# Patient Record
Sex: FEMALE
[De-identification: names, ages, dates, MRNs, and addresses within clinical notes are randomized per-mention and may not be internally consistent; named-entity substitution may affect disease eponyms.]

---

## 2019-10-23 ENCOUNTER — APPOINTMENT (OUTPATIENT)
Dept: OTOLARYNGOLOGY | Facility: CLINIC | Age: 44
End: 2019-10-23

## 2019-11-18 ENCOUNTER — APPOINTMENT (OUTPATIENT)
Dept: CT IMAGING | Facility: IMAGING CENTER | Age: 44
End: 2019-11-18
Payer: COMMERCIAL

## 2019-11-18 ENCOUNTER — OUTPATIENT (OUTPATIENT)
Dept: OUTPATIENT SERVICES | Facility: HOSPITAL | Age: 44
LOS: 1 days | End: 2019-11-18
Payer: COMMERCIAL

## 2019-11-18 DIAGNOSIS — J35.8 OTHER CHRONIC DISEASES OF TONSILS AND ADENOIDS: ICD-10-CM

## 2019-11-18 PROCEDURE — 70491 CT SOFT TISSUE NECK W/DYE: CPT | Mod: 26

## 2019-11-18 PROCEDURE — 70491 CT SOFT TISSUE NECK W/DYE: CPT

## 2019-12-09 ENCOUNTER — INBOUND DOCUMENT (OUTPATIENT)
Age: 44
End: 2019-12-09

## 2019-12-16 ENCOUNTER — APPOINTMENT (OUTPATIENT)
Dept: OTOLARYNGOLOGY | Facility: CLINIC | Age: 44
End: 2019-12-16
Payer: COMMERCIAL

## 2019-12-16 VITALS
WEIGHT: 101 LBS | HEART RATE: 121 BPM | HEIGHT: 59 IN | DIASTOLIC BLOOD PRESSURE: 80 MMHG | SYSTOLIC BLOOD PRESSURE: 144 MMHG | BODY MASS INDEX: 20.36 KG/M2

## 2019-12-16 DIAGNOSIS — Z86.39 PERSONAL HISTORY OF OTHER ENDOCRINE, NUTRITIONAL AND METABOLIC DISEASE: ICD-10-CM

## 2019-12-16 DIAGNOSIS — Z83.3 FAMILY HISTORY OF DIABETES MELLITUS: ICD-10-CM

## 2019-12-16 PROCEDURE — 99204 OFFICE O/P NEW MOD 45 MIN: CPT | Mod: 25

## 2019-12-16 PROCEDURE — 31575 DIAGNOSTIC LARYNGOSCOPY: CPT

## 2019-12-16 RX ORDER — PSYLLIUM HUSK 0.4 G
CAPSULE ORAL
Refills: 0 | Status: ACTIVE | COMMUNITY

## 2019-12-16 RX ORDER — ASCORBIC ACID 1000 MG
1000 TABLET, EXTENDED RELEASE ORAL
Refills: 0 | Status: ACTIVE | COMMUNITY

## 2019-12-16 RX ORDER — MULTIVITAMIN
TABLET ORAL
Refills: 0 | Status: ACTIVE | COMMUNITY

## 2019-12-16 NOTE — CONSULT LETTER
[Dear  ___] : Dear ~BRINDA, [Consult Letter:] : I had the pleasure of evaluating your patient, [unfilled]. [Consult Closing:] : Thank you very much for allowing me to participate in the care of this patient.  If you have any questions, please do not hesitate to contact me. [Please see my note below.] : Please see my note below. [Sincerely,] : Sincerely, [FreeTextEntry3] : Dev [FreeTextEntry2] : Dr. Italo Moura\[address] [facility] [address]

## 2019-12-16 NOTE — HISTORY OF PRESENT ILLNESS
[de-identified] : Referred by Dr. Moura for prominent tonsils.   Pt has has tonsil swelling, throat pain since beginning of October.  Pt has a CT neck on 11/18/19.  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  The patient was sick during the summer with a chronic cough and she feels may have attributed to the enlarged tonsils.  She reports intermittent pain along her throat but feels that this has gradually improved.  She denies any dyspnea, dysphagia, otalgia or weight loss.  She denies any history of tobacco or alcohol use.

## 2019-12-16 NOTE — DATA REVIEWED
[de-identified] : CT Neck with nonspecific enlargement of the right palatine tonsil and left lingual tonsil.  No LAD.

## 2019-12-16 NOTE — PROCEDURE
[Gag Reflex] : gag reflex preventing mirror examination [None] : none [Flexible Endoscope] : examined with the flexible endoscope [Serial Number: ___] : Serial Number: [unfilled] [de-identified] : No lesions in the NPx, HPx or larynx.  Exam of the OPx with slightly larger right tonsil and more prominent tissue along the left BOT/lingual tonsils.  No obvious lesions.  VC are mobile, airway patent.

## 2019-12-16 NOTE — PHYSICAL EXAM
[Laryngoscopy Performed] : laryngoscopy was performed, see procedure section for findings [FreeTextEntry1] : Right tonsil appears slightly larger - 2+ - when compared to the left.  No firm masses, no other lesions. [Normal] : no rashes

## 2020-02-03 ENCOUNTER — APPOINTMENT (OUTPATIENT)
Dept: OTOLARYNGOLOGY | Facility: CLINIC | Age: 45
End: 2020-02-03
Payer: COMMERCIAL

## 2020-02-03 VITALS
HEIGHT: 59 IN | DIASTOLIC BLOOD PRESSURE: 78 MMHG | HEART RATE: 120 BPM | SYSTOLIC BLOOD PRESSURE: 131 MMHG | BODY MASS INDEX: 20.36 KG/M2 | WEIGHT: 101 LBS

## 2020-02-03 DIAGNOSIS — Z00.00 ENCOUNTER FOR GENERAL ADULT MEDICAL EXAMINATION W/OUT ABNORMAL FINDINGS: ICD-10-CM

## 2020-02-03 DIAGNOSIS — R59.9 ENLARGED LYMPH NODES, UNSPECIFIED: ICD-10-CM

## 2020-02-03 DIAGNOSIS — J35.1 HYPERTROPHY OF TONSILS: ICD-10-CM

## 2020-02-03 PROCEDURE — 31575 DIAGNOSTIC LARYNGOSCOPY: CPT

## 2020-02-03 PROCEDURE — 99214 OFFICE O/P EST MOD 30 MIN: CPT | Mod: 25

## 2020-02-03 NOTE — PHYSICAL EXAM
[Laryngoscopy Performed] : laryngoscopy was performed, see procedure section for findings [Normal] : no rashes [FreeTextEntry1] : Right tonsil doesn't appear as asymmetric as long last exam when compared to the left.  No firm masses, no other lesions.

## 2020-02-03 NOTE — PROCEDURE
[Gag Reflex] : gag reflex preventing mirror examination [None] : none [Flexible Endoscope] : examined with the flexible endoscope [Serial Number: ___] : Serial Number: [unfilled] [de-identified] : No lesions in the NPx, HPx or larynx. Exam of the OPx with essentially symmetric palatine tonsils and more prominent tissue but stable along the left BOT/lingual tonsils. No obvious lesions. VC are mobile, airway patent. \par

## 2020-02-03 NOTE — HISTORY OF PRESENT ILLNESS
[de-identified] : Pt is f/up for an enlarged tonsils as noted on a CT from 11/18/2019.  Pt is sensitive to spicy foods.  She still feels throat soreness.  Denies dysphagia and dysphonia.  She feels that her symptoms are not as frequent.  The patient was sick during the summer with a chronic cough and she feels may have attributed to the enlarged tonsils. She reports intermittent pain along her throat but feels that this has gradually improved. She denies any dyspnea, dysphagia, otalgia or weight loss. She denies any history of tobacco or alcohol use.

## 2020-02-03 NOTE — CONSULT LETTER
[Dear  ___] : Dear ~BRINDA, [Courtesy Letter:] : I had the pleasure of seeing your patient, [unfilled], in my office today. [Please see my note below.] : Please see my note below. [Consult Closing:] : Thank you very much for allowing me to participate in the care of this patient.  If you have any questions, please do not hesitate to contact me. [FreeTextEntry2] : Dr. Italo Moura\par [address] [facility],\[place] [address]  [Sincerely,] : Sincerely, [FreeTextEntry3] : Dev

## 2020-03-24 ENCOUNTER — APPOINTMENT (OUTPATIENT)
Dept: OTOLARYNGOLOGY | Facility: CLINIC | Age: 45
End: 2020-03-24

## 2020-07-21 ENCOUNTER — APPOINTMENT (OUTPATIENT)
Dept: OTOLARYNGOLOGY | Facility: CLINIC | Age: 45
End: 2020-07-21
Payer: COMMERCIAL

## 2020-07-21 PROCEDURE — 99214 OFFICE O/P EST MOD 30 MIN: CPT | Mod: 25

## 2020-07-21 PROCEDURE — 31575 DIAGNOSTIC LARYNGOSCOPY: CPT

## 2020-07-21 NOTE — PROCEDURE
[None] : none [Gag Reflex] : gag reflex preventing mirror examination [Flexible Endoscope] : examined with the flexible endoscope [Serial Number: ___] : Serial Number: [unfilled] [de-identified] : No lesions in the NPx, HPx or larynx. Exam of the OPx with essentially symmetric palatine tonsils.  She has cryptic tonsils including the lingual tonsils and has clear tonsilliths visible specially on the left.  No obvious lesions. VC are mobile, airway patent.

## 2020-07-21 NOTE — PHYSICAL EXAM
[Laryngoscopy Performed] : laryngoscopy was performed, see procedure section for findings [Normal] : no rashes [FreeTextEntry1] : Right tonsil doesn't appear as asymmetric any longer.  No firm masses, no other lesions.

## 2020-07-21 NOTE — REASON FOR VISIT
[Subsequent Evaluation] : a subsequent evaluation for [FreeTextEntry2] : follow up for enlarged tonsils

## 2020-07-21 NOTE — HISTORY OF PRESENT ILLNESS
[de-identified] : Pt is f/up for an enlarged tonsils as noted on a CT from 11/18/2019. States still has occasional throat soreness but much improved since last visit, takes cough drops with relief. Denies dysphagia, odynophagia, dyspnea, dysphonia.  Her weight is stable.

## 2021-01-05 ENCOUNTER — APPOINTMENT (OUTPATIENT)
Dept: OTOLARYNGOLOGY | Facility: CLINIC | Age: 46
End: 2021-01-05
Payer: COMMERCIAL

## 2021-01-05 VITALS
HEART RATE: 112 BPM | SYSTOLIC BLOOD PRESSURE: 135 MMHG | BODY MASS INDEX: 21.37 KG/M2 | DIASTOLIC BLOOD PRESSURE: 83 MMHG | WEIGHT: 106 LBS | HEIGHT: 59 IN

## 2021-01-05 DIAGNOSIS — J35.8 OTHER CHRONIC DISEASES OF TONSILS AND ADENOIDS: ICD-10-CM

## 2021-01-05 DIAGNOSIS — J35.01 CHRONIC TONSILLITIS: ICD-10-CM

## 2021-01-05 PROCEDURE — 99213 OFFICE O/P EST LOW 20 MIN: CPT

## 2021-01-05 PROCEDURE — 99072 ADDL SUPL MATRL&STAF TM PHE: CPT

## 2021-01-05 NOTE — PHYSICAL EXAM
[Normal] : no rashes [FreeTextEntry1] : Right tonsil doesn't appear as asymmetric any longer.  No firm masses, no other lesions.

## 2021-01-05 NOTE — HISTORY OF PRESENT ILLNESS
[de-identified] : Pt is f/up for an enlarged tonsils as noted on a CT from 11/18/2019.  pt is feeling much better, sore throat only relate to certain food she eats.  Denies dysphagia, odynophagia, dyspnea, dysphonia.  Her weight is stable.  She has been gargling after each meal.  Her symptoms are much less frequent now.

## 2021-05-18 ENCOUNTER — APPOINTMENT (OUTPATIENT)
Dept: DISASTER EMERGENCY | Facility: OTHER | Age: 46
End: 2021-05-18
Payer: COMMERCIAL

## 2021-05-18 PROCEDURE — 0012A: CPT
